# Patient Record
Sex: FEMALE | Race: WHITE | NOT HISPANIC OR LATINO | Employment: STUDENT | URBAN - METROPOLITAN AREA
[De-identification: names, ages, dates, MRNs, and addresses within clinical notes are randomized per-mention and may not be internally consistent; named-entity substitution may affect disease eponyms.]

---

## 2017-01-06 ENCOUNTER — OFFICE VISIT (OUTPATIENT)
Dept: OBSTETRICS AND GYNECOLOGY | Facility: CLINIC | Age: 22
End: 2017-01-06

## 2017-01-06 VITALS
WEIGHT: 142 LBS | HEART RATE: 80 BPM | SYSTOLIC BLOOD PRESSURE: 114 MMHG | HEIGHT: 63 IN | BODY MASS INDEX: 25.16 KG/M2 | DIASTOLIC BLOOD PRESSURE: 71 MMHG

## 2017-01-06 DIAGNOSIS — N76.0 ACUTE VAGINITIS: ICD-10-CM

## 2017-01-06 DIAGNOSIS — Z01.411 ENCOUNTER FOR GYNECOLOGICAL EXAMINATION WITH ABNORMAL FINDING: ICD-10-CM

## 2017-01-06 DIAGNOSIS — Z11.3 SCREEN FOR STD (SEXUALLY TRANSMITTED DISEASE): Primary | ICD-10-CM

## 2017-01-06 PROCEDURE — 99385 PREV VISIT NEW AGE 18-39: CPT | Performed by: OBSTETRICS & GYNECOLOGY

## 2017-01-06 RX ORDER — ETONOGESTREL AND ETHINYL ESTRADIOL 11.7; 2.7 MG/1; MG/1
INSERT, EXTENDED RELEASE VAGINAL
Qty: 3 EACH | Refills: 4 | Status: SHIPPED | OUTPATIENT
Start: 2017-01-06

## 2017-01-06 RX ORDER — CETIRIZINE HYDROCHLORIDE 10 MG/1
10 TABLET ORAL DAILY
COMMUNITY

## 2017-01-06 NOTE — PROGRESS NOTES
"Khang Nixon is a 21 y.o. female annual exam.    History of Present Illness    Patient is a 21 y.o. for annual exam. Wants STD testing.  Patient c/o vaginal discharge with odor x 2-3 weeks.  Patient was seen at [facility] and diagnosed with a UTI. Patient is s/p HPV vaccine. Patient also wants to start birth control. It is difficult for patient to remember to take a pill daily.    Health Maintenance   Topic Date Due   • PNEUMOCOCCAL VACCINE (19-64 MEDIUM RISK) (1 of 1 - PPSV23) 05/07/2015   • TDAP/TD VACCINES (1 - Tdap) 05/07/2015   • INFLUENZA VACCINE  08/01/2016       The following portions of the patient's history were reviewed and updated as appropriate: allergies, current medications, past family history, past medical history, past social history, past surgical history and problem list.    Review of Systems   Genitourinary:        See HPI   All other systems reviewed and are negative.      Vitals:    01/06/17 1032   BP: 114/71   Pulse: 80   Weight: 142 lb (64.4 kg)   Height: 63\" (160 cm)       Objective   Physical Exam   Constitutional: She is oriented to person, place, and time. She appears well-developed and well-nourished.   HENT:   Head: Normocephalic and atraumatic.   Eyes: Conjunctivae and EOM are normal.   Neck: Normal range of motion. Neck supple. No thyromegaly present.   Cardiovascular: Normal rate, regular rhythm and normal heart sounds.    Pulmonary/Chest: Effort normal and breath sounds normal. Right breast exhibits no mass, no nipple discharge and no tenderness. Left breast exhibits no mass, no nipple discharge and no tenderness.   Abdominal: Soft. Bowel sounds are normal. There is no hepatosplenomegaly. There is no tenderness. No hernia.   Genitourinary: Rectum normal, vagina normal and uterus normal. Rectal exam shows no external hemorrhoid. There is no rash, tenderness or lesion on the right labia. There is no rash, tenderness or lesion on the left labia. Uterus is not " enlarged and not tender. Cervix exhibits no discharge. Right adnexum displays no mass and no tenderness. Left adnexum displays no mass and no tenderness. No tenderness in the vagina. No vaginal discharge found.   Musculoskeletal: Normal range of motion. She exhibits no edema.   Lymphadenopathy:        Right: No inguinal adenopathy present.        Left: No inguinal adenopathy present.   Neurological: She is alert and oriented to person, place, and time.   Skin: Skin is warm and dry. No rash noted.   Psychiatric: She has a normal mood and affect.   Vitals reviewed.        Assessment/Plan   Deonna was seen today for annual exam.    Diagnoses and all orders for this visit:    Screen for STD (sexually transmitted disease)  -     NuSwab Vaginitis Plus  -     HIV-1 / O / 2 Ag / Antibody 4th Generation  -     Hepatitis B Surface Antigen  -     Hepatitis C Antibody  -     RPR    Acute vaginitis  -     NuSwab Vaginitis Plus    Encounter for gynecological examination with abnormal finding    Other orders  -     etonogestrel-ethinyl estradiol (NUVARING) 0.12-0.015 MG/24HR vaginal ring; Insert vaginally and leave in place for 3 consecutive weeks, then remove for 1 week.                 Return in about 1 year (around 1/6/2018).

## 2017-01-06 NOTE — MR AVS SNAPSHOT
Deonna Nixon   2017 10:00 AM   Office Visit    Dept Phone:  [phone]   Encounter #:  [id number]    Provider:  Anh Howard MD   Department:  [facility]                Your Full Care Plan              Your Updated Medication List          This list is accurate as of: 17 11:04 AM.  Always use your most recent med list.                cetirizine 10 MG tablet   Commonly known as:  zyrTEC               We Performed the Following     Hepatitis B Surface Antigen     Hepatitis C Antibody     HIV-1 / O / 2 Ag / Antibody 4th Generation     NuSwab Vaginitis Plus     RPR       You Were Diagnosed With        Codes Comments    Screen for STD (sexually transmitted disease)    -  Primary ICD-10-CM: Z11.3  ICD-9-CM: V74.5     Acute vaginitis     ICD-10-CM: N76.0  ICD-9-CM: 616.10     Encounter for gynecological examination with abnormal finding     ICD-10-CM: Z01.411  ICD-9-CM: V72.31       Instructions     None    Patient Instructions History      Upcoming Appointments     Visit Type Date Time Department    NEW PATIENT 2017 10:00 AM [facility]      [employer] Signup     [facility] allows you to send messages to your doctor, view your test results, renew your prescriptions, schedule appointments, and more. To sign up, go to [employer] and click on the Sign Up Now link in the New User? box. Enter your [employer] Activation Code exactly as it appears below along with the last four digits of your Social Security Number and your Date of Birth () to complete the sign-up process. If you do not sign up before the expiration date, you must request a new code.    [employer] Activation Code: [email or web address]  Expires: 2017 11:04 AM    If you have questions, you can email [email or web address] or call [phone] to talk to our [employer] staff. Remember, [employer] is NOT to be used for urgent needs. For medical emergencies,  "dial 911.               Other Info from Your Visit           Allergies     No Known Allergies      Reason for Visit     Annual Exam           Vital Signs     Blood Pressure Pulse Height Weight Last Menstrual Period Breastfeeding?    114/71 80 63\" (160 cm) 142 lb (64.4 kg) 12/23/2016 (Approximate) No    Body Mass Index Smoking Status                25.15 kg/m2 Current Every Day Smoker          Problems and Diagnoses Noted     Encounter for routine gynecological examination    Screening for STDs (sexually transmitted diseases)    -  Primary    Inflammation of vagina            "

## 2017-01-07 LAB
HBV SURFACE AG SERPL QL IA: NEGATIVE
HCV AB S/CO SERPL IA: 0.1 S/CO RATIO (ref 0–0.9)
HIV 1+2 AB+HIV1 P24 AG SERPL QL IA: NON REACTIVE

## 2017-01-09 LAB — RPR SER QL: NORMAL

## 2017-01-12 LAB
A VAGINAE DNA VAG QL NAA+PROBE: ABNORMAL SCORE
BVAB2 DNA VAG QL NAA+PROBE: ABNORMAL SCORE
C ALBICANS DNA VAG QL NAA+PROBE: NEGATIVE
C GLABRATA DNA VAG QL NAA+PROBE: NEGATIVE
C TRACH RRNA SPEC QL NAA+PROBE: NEGATIVE
MEGA1 DNA VAG QL NAA+PROBE: ABNORMAL SCORE
N GONORRHOEA RRNA SPEC QL NAA+PROBE: NEGATIVE
T VAGINALIS RRNA SPEC QL NAA+PROBE: NEGATIVE

## 2017-01-12 RX ORDER — METRONIDAZOLE 500 MG/1
500 TABLET ORAL 2 TIMES DAILY
Qty: 14 TABLET | Refills: 0 | Status: SHIPPED | OUTPATIENT
Start: 2017-01-12

## 2017-01-24 ENCOUNTER — TELEPHONE (OUTPATIENT)
Dept: OBSTETRICS AND GYNECOLOGY | Facility: CLINIC | Age: 22
End: 2017-01-24

## 2017-01-24 NOTE — TELEPHONE ENCOUNTER
----- Message from Isabell Reis MA sent at 1/19/2017 11:39 AM EST -----  Left another message to call [email or web address]